# Patient Record
Sex: FEMALE | Race: OTHER | URBAN - METROPOLITAN AREA
[De-identification: names, ages, dates, MRNs, and addresses within clinical notes are randomized per-mention and may not be internally consistent; named-entity substitution may affect disease eponyms.]

---

## 2023-11-07 ENCOUNTER — EMERGENCY (EMERGENCY)
Facility: HOSPITAL | Age: 33
LOS: 1 days | Discharge: HOME | End: 2023-11-07
Attending: EMERGENCY MEDICINE | Admitting: EMERGENCY MEDICINE
Payer: SELF-PAY

## 2023-11-07 VITALS
RESPIRATION RATE: 18 BRPM | TEMPERATURE: 98 F | DIASTOLIC BLOOD PRESSURE: 85 MMHG | OXYGEN SATURATION: 98 % | HEART RATE: 130 BPM | SYSTOLIC BLOOD PRESSURE: 133 MMHG

## 2023-11-07 PROCEDURE — 99283 EMERGENCY DEPT VISIT LOW MDM: CPT

## 2023-11-07 NOTE — ED PROVIDER NOTE - NSFOLLOWUPINSTRUCTIONS_ED_ALL_ED_FT
Follow up with your primary care doctor or clinics listed below if you do not have a doctor  [address] [address]  To make an appointment, call [phone]  [facility]  Address: [facility] [place] [address]  Appointment Center: [phone] ([phone])      Call SBIRT to help with drug and alcohol use disorder.  Hours of operations  7 days/week  8:00 am – 8:00 pm  During off hours, leave a voicemail for  next day call back.  Contact MAGDALENA  [phone]  [email or web address] Follow up with your primary care doctor or clinics listed below if you do not have a doctor  [address] [address]  To make an appointment, call [phone]  [facility]  Address: [facility] [place] [address]  Appointment Center: [phone] ([phone])      Call SBIRT to help with drug and alcohol use disorder.  Hours of operations  7 days/week  8:00 am – 8:00 pm  During off hours, leave a voicemail for  next day call back.  Contact MAGDALENA  [phone]  [email or web address] Follow up with your primary care doctor or clinics listed below if you do not have a doctor  [address] [address]  To make an appointment, call [phone]  [facility]  Address: [facility] [place] [address]  Appointment Center: [phone] ([phone])      Call SBIRT to help with drug and alcohol use disorder.  Hours of operations  7 days/week  8:00 am – 8:00 pm  During off hours, leave a voicemail for  next day call back.  Contact MAGDALENA  [phone]  [email or web address]

## 2023-11-07 NOTE — ED PROVIDER NOTE - OBJECTIVE STATEMENT
Patient states she did cocaine with her friend then lost consciousness and responded to Narcan.  Denies trauma.  Declines fingerstick, blood testing, urine toxicology, fentanyl testing.

## 2023-11-07 NOTE — ED PROVIDER NOTE - PATIENT PORTAL LINK FT
You can access the FollowMyHealth Patient Portal offered by [facility] by registering at the following website: [email or web address]. By joining [employer]’s FollowMyHealth portal, you will also be able to view your health information using other applications (apps) compatible with our system. You can access the FollowMyHealth Patient Portal offered by [facility] by registering at the following website: [email or web address]. By joining [employer]’s FollowMyHealth portal, you will also be able to view your health information using other applications (apps) compatible with our system. You can access the FollowMyHealth Patient Portal offered by [facility] by registering at the following website: [email or web address]. By joining [employer]’s FollowMyHealth portal, you will also be able to view your health information using other applications (apps) compatible with our system.

## 2023-11-07 NOTE — ED ADULT TRIAGE NOTE - CHIEF COMPLAINT QUOTE
BIBA with complaints of overdose, admits to cocaine that was possibly laced with fentanyl. Arrives aox4 in no distress. Free of injury. 2mg narcan IN given en route to ed.

## 2023-11-07 NOTE — ED ADULT NURSE NOTE - OBJECTIVE STATEMENT
pt uncooperative with assessment. refused ekg and fs. pt ambulatory with steady gait denies sob or chest pain at this time

## 2023-11-07 NOTE — ED PROVIDER NOTE - CLINICAL SUMMARY MEDICAL DECISION MAKING FREE TEXT BOX
Patient states she did cocaine with her friend then lost consciousness and responded to Narcan.  Denies trauma.  Declines fingerstick, blood testing, urine toxicology, fentanyl testing.      Narcan home kit prescribed.

## 2023-11-09 DIAGNOSIS — R41.82 ALTERED MENTAL STATUS, UNSPECIFIED: ICD-10-CM

## 2023-11-09 DIAGNOSIS — F17.200 NICOTINE DEPENDENCE, UNSPECIFIED, UNCOMPLICATED: ICD-10-CM

## 2023-11-09 DIAGNOSIS — T40.5X1A POISONING BY COCAINE, ACCIDENTAL (UNINTENTIONAL), INITIAL ENCOUNTER: ICD-10-CM
